# Patient Record
Sex: MALE | Race: BLACK OR AFRICAN AMERICAN
[De-identification: names, ages, dates, MRNs, and addresses within clinical notes are randomized per-mention and may not be internally consistent; named-entity substitution may affect disease eponyms.]

---

## 2019-06-26 ENCOUNTER — HOSPITAL ENCOUNTER (EMERGENCY)
Dept: HOSPITAL 35 - ER | Age: 64
Discharge: HOME | End: 2019-06-26
Payer: COMMERCIAL

## 2019-06-26 VITALS — BODY MASS INDEX: 27.98 KG/M2 | WEIGHT: 225 LBS | HEIGHT: 75 IN

## 2019-06-26 VITALS — SYSTOLIC BLOOD PRESSURE: 159 MMHG | DIASTOLIC BLOOD PRESSURE: 72 MMHG

## 2019-06-26 DIAGNOSIS — K40.90: Primary | ICD-10-CM

## 2019-06-26 LAB
ALBUMIN SERPL-MCNC: 3.5 G/DL (ref 3.4–5)
ALT SERPL-CCNC: 34 U/L (ref 30–65)
ANION GAP SERPL CALC-SCNC: 10 MMOL/L (ref 7–16)
AST SERPL-CCNC: 16 U/L (ref 15–37)
BASOPHILS NFR BLD AUTO: 0.7 % (ref 0–2)
BILIRUB SERPL-MCNC: 0.8 MG/DL
BILIRUB UR-MCNC: NEGATIVE MG/DL
BUN SERPL-MCNC: 10 MG/DL (ref 7–18)
CALCIUM SERPL-MCNC: 8.6 MG/DL (ref 8.5–10.1)
CHLORIDE SERPL-SCNC: 104 MMOL/L (ref 98–107)
CO2 SERPL-SCNC: 27 MMOL/L (ref 21–32)
COLOR UR: YELLOW
CREAT SERPL-MCNC: 0.9 MG/DL (ref 0.7–1.3)
EOSINOPHIL NFR BLD: 3.5 % (ref 0–3)
ERYTHROCYTE [DISTWIDTH] IN BLOOD BY AUTOMATED COUNT: 12.9 % (ref 10.5–14.5)
GLUCOSE SERPL-MCNC: 114 MG/DL (ref 74–106)
GRANULOCYTES NFR BLD MANUAL: 60.6 % (ref 36–66)
HCT VFR BLD CALC: 40.1 % (ref 42–52)
HGB BLD-MCNC: 13.8 GM/DL (ref 14–18)
KETONES UR STRIP-MCNC: NEGATIVE MG/DL
LYMPHOCYTES NFR BLD AUTO: 26.4 % (ref 24–44)
MCH RBC QN AUTO: 32.3 PG (ref 26–34)
MCHC RBC AUTO-ENTMCNC: 34.3 G/DL (ref 28–37)
MCV RBC: 94.1 FL (ref 80–100)
MONOCYTES NFR BLD: 8.8 % (ref 1–8)
NEUTROPHILS # BLD: 2.9 THOU/UL (ref 1.4–8.2)
PLATELET # BLD: 334 THOU/UL (ref 150–400)
POTASSIUM SERPL-SCNC: 4.3 MMOL/L (ref 3.5–5.1)
PROT SERPL-MCNC: 7.2 G/DL (ref 6.4–8.2)
RBC # BLD AUTO: 4.26 MIL/UL (ref 4.5–6)
RBC # UR STRIP: (no result) /UL
SODIUM SERPL-SCNC: 141 MMOL/L (ref 136–145)
SP GR UR STRIP: 1.02 (ref 1–1.03)
URINE CLARITY: CLEAR
URINE GLUCOSE-RANDOM*: NEGATIVE
URINE LEUKOCYTES-REFLEX: NEGATIVE
URINE NITRITE-REFLEX: NEGATIVE
URINE PROTEIN (DIPSTICK): NEGATIVE
UROBILINOGEN UR STRIP-ACNC: 0.2 E.U./DL (ref 0.2–1)
WBC # BLD AUTO: 4.7 THOU/UL (ref 4–11)

## 2020-06-10 ENCOUNTER — HOSPITAL ENCOUNTER (OUTPATIENT)
Dept: HOSPITAL 35 - OR | Age: 65
Discharge: HOME | End: 2020-06-10
Attending: SURGERY
Payer: COMMERCIAL

## 2020-06-10 VITALS — DIASTOLIC BLOOD PRESSURE: 76 MMHG | SYSTOLIC BLOOD PRESSURE: 158 MMHG

## 2020-06-10 VITALS — WEIGHT: 250 LBS | HEIGHT: 75 IN | BODY MASS INDEX: 31.08 KG/M2

## 2020-06-10 DIAGNOSIS — N40.0: ICD-10-CM

## 2020-06-10 DIAGNOSIS — K40.90: Primary | ICD-10-CM

## 2020-06-10 DIAGNOSIS — Z79.899: ICD-10-CM

## 2020-06-10 DIAGNOSIS — Z87.891: ICD-10-CM

## 2020-06-10 DIAGNOSIS — Z98.890: ICD-10-CM

## 2020-06-10 DIAGNOSIS — Z11.59: ICD-10-CM

## 2020-06-10 DIAGNOSIS — E78.5: ICD-10-CM

## 2020-06-10 DIAGNOSIS — Z96.642: ICD-10-CM

## 2020-06-10 DIAGNOSIS — K42.0: ICD-10-CM

## 2020-06-10 PROCEDURE — 56526: CPT

## 2020-06-10 PROCEDURE — 56524: CPT

## 2020-06-10 PROCEDURE — 62110: CPT

## 2020-06-10 PROCEDURE — 50417: CPT

## 2020-06-10 PROCEDURE — 50101: CPT

## 2020-06-10 PROCEDURE — 54118: CPT

## 2020-06-10 PROCEDURE — 62850: CPT

## 2020-06-10 PROCEDURE — 50386 REMOVE STENT VIA TRANSURETH: CPT

## 2020-06-10 PROCEDURE — 70005: CPT

## 2020-06-10 PROCEDURE — 56525: CPT

## 2020-06-10 PROCEDURE — 50010 RENAL EXPLORATION: CPT

## 2020-06-10 PROCEDURE — 54111 TREAT PENIS LESION GRAFT: CPT

## 2020-06-10 NOTE — O
[facility]
[place]   [address]                     OPERATIVE REPORT              
_______________________________________________________________________________
 
Name:       GUZMAN COLE              Room #:                     DEP [facility]#:      [id number]                       Account #:      [id number]  
Admission:  06/10/20    Attend Phys:    Cristofer Jara MD  
Discharge:  06/10/20    Date of Birth:  [birth year]  
                                                          Report #: [id number]
                                                                    [id number]   
_______________________________________________________________________________
THIS REPORT FOR:  
 
cc:  Sung Olmedo,Cristofer Oates MD                                          ~
CC: Sung Osei
 
DATE OF SERVICE:  06/10/2020
 
 
Patient of Dr. Cristofer Jara and Dr. Sung Olmedo.
 
PREOPERATIVE DIAGNOSES:  Left inguinal hernia and an incarcerated umbilical
hernia.
 
POSTOPERATIVE DIAGNOSES:  Left inguinal hernia with a left cord lipoma and an
incarcerated left umbilical hernia.
 
PROCEDURE:  Repair of a left inguinal hernia with Prolene hernia system mesh and
excision of a left cord lipoma and repair of an incarcerated umbilical hernia.
 
SURGEON:  Cristofer Jara MD
 
ANESTHESIA:  Local IV sedation.
 
DESCRIPTION OF PROCEDURE:  The patient was brought to the operating room and
placed on operative table in the supine position.  Sequential compression
devices were in place for DVT prophylaxis.  There was no indication for
preoperative antibiotics.  The patient underwent IV sedation.  The abdomen and
groin were prepped and draped in a sterile fashion.  Skin and subcutaneous
tissue overlying the left inguinal area was infiltrated with 0.5% Marcaine and
1% Xylocaine in a 1:1 mixture.  Left inguinal skin incision was then performed
using #10 scalpel blade.  Hemostasis obtained using electrocautery as well as
clamps and 2-0 chromic ties.  Dissection was carried down through subcutaneous
tissue, the external oblique fascia, which was then incised with a knife and
opened with the Metzenbaum scissors.  The ilioinguinal nerve was identified,
dissected free, injected with a local mixture and preserved.  The cord was then
elevated and held in place with a Penrose drain.  Cremasteric muscle fibers were
then split in the direction of their fibers using clamp and electrocautery.  A
cord lipoma was identified, dissected free, clamped, excised and tied with 2-0
chromic ties.  The cord was inspected and there was a moderate-to-large sized
indirect inguinal hernia sac, which was then dissected free and reduced back
through the internal ring.  The floor was inspected and found to be intact.  An
extended Prolene hernia system mesh was then inserted through the internal ring
 
 
 
[address]   [address]                     OPERATIVE REPORT              
_______________________________________________________________________________
 
Name:       GUZMAN COLE              [facility]                     [id number]                       Account #:      [id number]  
Admission:  06/10/20    Attend Phys:    Cristofer Jara MD  
Discharge:  06/10/20    Date of Birth:  [birth year]  
                                                          Report #: [id number]
                                                                    [id number]   
_______________________________________________________________________________
and the underlay patch was then deployed in the preperitoneal space.  The
connector was left in the internal ring and the overlay patch was then deployed
into the inguinal canal.  The mesh was secured at the pubic tubercle superiorly
and at the connector using simple interrupted 2-0 Vicryl sutures.  The mesh was
split, wrapped around the cord, secured to the inguinal ligament with simple
interrupted 2-0 Vicryl suture.  The cord and ilioinguinal nerve were then
returned to the canal intact.  The external oblique fascia was then closed using
running 2-0 Vicryl suture.  Clara's fascia was then reapproximated using 3
simple interrupted 2-0 chromic sutures and the skin then closed with a running
4-0 subcuticular Vicryl stitch.  The wound was then dressed with Mastisol,
1/2-inch Steri-Strips cut in half, Telfa, 4 x 4 gauze, sponge and tape. 
Attention was then turned to the umbilicus, which was then infiltrated around
the umbilicus using 0.5% Marcaine and 1% Xylocaine in a 1:1 mixture.  A
transverse infraumbilical skin incision was then performed using a #15 scalpel
blade.  Hemostasis obtained using the electrocautery.  The dissection was
carried down through the subcutaneous tissue to an incarcerated umbilical hernia
sac, which contained some preperitoneal fat.  This was all dissected free and
reduced back into the abdomen.  The fascial defect was then closed using
interrupted figure-of-eight #1 Prolene sutures.  The umbilicus was then tacked
to the fascia and the deep and superficial subcutaneous tissue.  The fascia was
then reapproximated using simple interrupted 2-0 chromic sutures.  Skin was then
closed using running 4-0 subcuticular Vicryl stitch.  The wound was then dressed
with Dermabond and the inguinal incision was dressed with Mastisol, 1/2-inch
Steri-Strips cut in half and then both incisions were dressed with Telfa and 4 x
4 gauze, sponge and tape.  The patient was then taken to the recovery room
awake, alert and in good condition.  Estimated blood loss was approximately 10
mL total and the patient tolerated procedure well.  All sponge, lap and
instrument counts correct x 2.
 
 
 
 
 
 
 
 
 
 
 
 
 
 
 
 
  <ELECTRONICALLY SIGNED>
   By: Cristofer Jara MD          
  06/10/20     1643
D: 06/10/20 1119                           _____________________________________
T: 06/10/20 1129                           Cristofer Jara MD            /nt

## 2020-06-10 NOTE — H
[facility]
Ted [address]
[place]   [address]                     HISTORY AND PHYSICAL          
_______________________________________________________________________________
 
Name:       GUZMAN COLE              Room #:         150-2       [facility].#:      [id number]                       Account #:      [id number]  
Admission:  06/10/20    Attend Phys:    Cristofer Jara MD  
Discharge:              Date of Birth:  [birth year]  
                                                          Report #: [id number]
                                                                    [id number]   
_______________________________________________________________________________
THIS REPORT FOR:  
 
cc:  Sung Olmedo,Sung Gomez,Cristofer MOSQUERA MD                                          ~
CC: Sung Osei
 
DATE OF ADMISSION AND SURGERY:  06/10/2020
 
CHIEF COMPLAINT:  Lump in my groin and belly button.
 
HISTORY OF PRESENT ILLNESS:  The patient is a 64-year-old -American male
who about a year ago started noticing a lump in his left groin.  It has
gradually been getting larger and becomes more painful and burns at times.  He
has also noticed a bulge in his umbilicus for many years.  He denied any
previous history of any other hernias.  No changes in bowel habits.  His last
colonoscopy was 3 years ago and was normal.  He also denied any urinary changes.
 He saw Dr. Olmedo who recommended surgical consultation.
 
PAST MEDICAL HISTORY:  Hyperlipidemia, erectile dysfunction, benign prostatic
hypertrophy, left hip replacement surgery in 2007.
 
MEDICATIONS:  Minocycline 100 mg p.o. q. 12 hours, Viagra p.r.n., atorvastatin
40 mg p.o. every day, lisinopril 40 mg p.o. every day, meloxicam 7.5 mg p.o.
every day, dutasteride 0.5 mg p.o. every day.
 
ALLERGIES:  No known drug allergies.
 
FAMILY HISTORY:  Noncontributory.
 
SOCIAL HISTORY:  , quit smoking in 2013.  Drinks alcohol occasionally and
in moderation.
 
REVIEW OF SYSTEMS  Pertinent positives as above.  Full review of systems
otherwise negative.
 
PHYSICAL EXAMINATION:
GENERAL:  This is a well-developed, well-nourished -American male in no
acute distress.
VITAL SIGNS:  Stable.  He is afebrile.
HEENT:  Sclerae are nonicteric.  Mucous membranes moist and pink.
NECK:  There is no adenopathy.
LUNGS:  Clear to auscultation bilaterally, no excursion.
CARDIOVASCULAR:  Regular rate and rhythm.  No murmurs, S3 or S4.  Normal PMI.
 
 
 
[address]   [address]                     HISTORY AND PHYSICAL          
_______________________________________________________________________________
 
Name:       GUZMAN COLE              Room #:         [facility]       [facility]..#:      [id number]                       Account #:      [id number]  
Admission:  06/10/20    Attend Phys:    Cristofer Jara MD  
Discharge:              Date of Birth:  [birth year]  
                                                          Report #: [id number]
                                                                    [id number]   
_______________________________________________________________________________
ABDOMEN:  Soft, flat and nontender.  No palpable masses, no organomegaly.  He
does have a small to moderate reducible umbilical hernia.
EXTREMITIES:  No clubbing, cyanosis or edema.
GENITOURINARY:  Normal scrotum, phallus and testes.  There was a moderate to
large partially reducible tender left inguinal hernia.
NEUROLOGIC:  Intact with a clear mental status.
 
IMPRESSION:  A 64-year-old -American male with a left inguinal hernia and
umbilical hernia.  I fully discussed with the patient diagnosis, prognosis, and
treatment options and the risks and benefits of each.  He states he understands
and agrees to proposed surgery.
 
PLAN:  We will perform left inguinal hernia repair and umbilical hernia repair
under local IV sedation as an outpatient at [facility].  The
procedure and its risks, benefits and possible complications were fully
discussed with the patient.  He states he understands and agrees to proposed
surgery.  We also discussed the use of mesh for these hernias.
 
 
 
 
 
 
 
 
 
 
 
 
 
 
 
 
 
 
 
 
 
 
 
 
 
 
 
  <ELECTRONICALLY SIGNED>
   By: Cristofer Jara MD          
  06/10/20     0910
D: 06/09/20 1258                           _____________________________________
T: 06/09/20 1309                           Cristofer Jara MD            /nt

## 2020-06-10 NOTE — EKG
[facility]
[place]   [address]                     ELECTROCARDIOGRAM REPORT      
_______________________________________________________________________________
 
Name:       GUZMAN COLE              Room #:         150-2       [facility]..#:      [id number]                       Account #:      [id number]  
Admission:  06/10/20    Attend Phys:    Cristofer Jara MD  
Discharge:              Date of Birth:  [birth year]  
                                                          Report #: [id number]
                                                                    [id number]
_______________________________________________________________________________
THIS REPORT FOR:  
 
cc:  Sung Olmedo David J. DO Lundgren, Craig H. MD [facility]                                        ~
THIS REPORT FOR:   //name//                          
 
                          [facility]
                                       
Test Date:    2020-06-10               Test Time:    07:14:42
Pat Name:     GUZMAN COLE           Department:   
Patient ID:   [id number]            Room:          
Gender:                               Technician:   CHRISTIANA
:          1955               Requested By: Cristofer Jara
Order Number: [id number] MD:   Kristopher Herrera
                                 Measurements
Intervals                              Axis          
Rate:         57                       P:            56
OR:           138                      QRS:          23
QRSD:         104                      T:            27
QT:           423                                    
QTc:          412                                    
                           Interpretive Statements
Sinus bradycardia
Otherwise normal tracing
No previous ECG available for comparison
 
Electronically Signed On 6- 8:21:03 CDT by Kristopher Herrera
[email or web address]
 
 
 
 
 
 
 
 
 
 
 
 
 
 
 
  <ELECTRONICALLY SIGNED>
   By: Kristopher Herrera MD, [facility]   
  06/10/20     0821
D: 06/10/20 0714                           _____________________________________
T: 06/10/20 0714                           Kristopher Herrera MD, [facility]     /EPI

## 2020-06-11 NOTE — PATH
[facility]
[address]
[place]   [address]                     PATHOLOGY RPT PROCEDURE       
_______________________________________________________________________________
 
Name:       BRIAN COLE CAROLINE              Room #:                     DEP [facility]..#:      [id number]     Account #:      [id number]  
Admission:  06/10/20    Date of Birth:  [birth year]  
Discharge:  06/10/20                                    Report #:    [id number]
                                                        Path Case #: [id number]
_______________________________________________________________________________
 
[facility] Accession Number: [id number]
.                                                                01
Material submitted:                                        .
hernia - CORD LIPOMA. Modifiers: left, inguinal
.                                                                01
Clinical history:                                          .
Left inguinal hernia/umbilical hernia
.                                                                02
**********************************************************************
Diagnosis:
Mature adipose tissue, cord lipoma, resection:
- Compatible with a lipoma.
- Congestion and reactive changes identified within adjacent soft tissue.
.
(IUV:mml; 06/11/2020)
QL  06/11/2020  1413 Local
**********************************************************************
.                                                                02
Electronically signed:                                     .
Maru Morejon MD, Pathologist
NPI- [id number]
.                                                                01
Gross description:                                         .
The specimen is received in formalin, labeled "Elnora, Brian, cord
lipoma" and consists of a segment of partially encased in yellow orange
lobulated tissue measuring 7.7 x 2.3 x 1.4 cm.  Sectioning reveals no
gross lesions and representative tissue is submitted in A1.
(SDY; 6/10/2020)
SYU/SYU  06/10/2020  1714 Local
.                                                                02
Pathologist provided ICD-10:
D17.79
.                                                                02
CPT                                                        .
608821
Specimen Comment: A courtesy copy of this report has been sent to [phone],
[phone]
Specimen Comment: [id number]
Specimen Comment: Report sent to  / DR VIGIL
***Performed at:  01
   [address], [place]  [id number]
   MD Kiet Garcia MD Phone:  [phone]
***Performed at:  02
   [address]  [id number]
   MD Maru Morejon MD Phone:  [phone]